# Patient Record
Sex: MALE | Race: BLACK OR AFRICAN AMERICAN | NOT HISPANIC OR LATINO | URBAN - METROPOLITAN AREA
[De-identification: names, ages, dates, MRNs, and addresses within clinical notes are randomized per-mention and may not be internally consistent; named-entity substitution may affect disease eponyms.]

---

## 2019-04-27 ENCOUNTER — INPATIENT (INPATIENT)
Facility: HOSPITAL | Age: 84
LOS: 2 days | Discharge: NURSING, REHAB OR LONG-TERM CARE | End: 2019-04-30
Attending: HOSPITALIST | Admitting: HOSPITALIST
Payer: MEDICARE

## 2019-04-27 VITALS
TEMPERATURE: 98 F | SYSTOLIC BLOOD PRESSURE: 151 MMHG | DIASTOLIC BLOOD PRESSURE: 77 MMHG | HEART RATE: 52 BPM | RESPIRATION RATE: 16 BRPM | OXYGEN SATURATION: 98 %

## 2019-04-27 DIAGNOSIS — I10 ESSENTIAL (PRIMARY) HYPERTENSION: ICD-10-CM

## 2019-04-27 DIAGNOSIS — N39.0 URINARY TRACT INFECTION, SITE NOT SPECIFIED: ICD-10-CM

## 2019-04-27 DIAGNOSIS — I71.4 ABDOMINAL AORTIC ANEURYSM, WITHOUT RUPTURE: ICD-10-CM

## 2019-04-27 DIAGNOSIS — R09.89 OTHER SPECIFIED SYMPTOMS AND SIGNS INVOLVING THE CIRCULATORY AND RESPIRATORY SYSTEMS: ICD-10-CM

## 2019-04-27 DIAGNOSIS — Z29.9 ENCOUNTER FOR PROPHYLACTIC MEASURES, UNSPECIFIED: ICD-10-CM

## 2019-04-27 DIAGNOSIS — R31.9 HEMATURIA, UNSPECIFIED: ICD-10-CM

## 2019-04-27 DIAGNOSIS — R05 COUGH: ICD-10-CM

## 2019-04-27 DIAGNOSIS — D64.9 ANEMIA, UNSPECIFIED: ICD-10-CM

## 2019-04-27 LAB
ALBUMIN SERPL ELPH-MCNC: 3.1 G/DL — LOW (ref 3.3–5)
ALP SERPL-CCNC: 40 U/L — SIGNIFICANT CHANGE UP (ref 40–120)
ALT FLD-CCNC: 7 U/L — SIGNIFICANT CHANGE UP (ref 4–41)
ANION GAP SERPL CALC-SCNC: 9 MMO/L — SIGNIFICANT CHANGE UP (ref 7–14)
APTT BLD: 32.3 SEC — SIGNIFICANT CHANGE UP (ref 27.5–36.3)
AST SERPL-CCNC: 10 U/L — SIGNIFICANT CHANGE UP (ref 4–40)
BASOPHILS # BLD AUTO: 0.06 K/UL — SIGNIFICANT CHANGE UP (ref 0–0.2)
BASOPHILS NFR BLD AUTO: 0.8 % — SIGNIFICANT CHANGE UP (ref 0–2)
BILIRUB SERPL-MCNC: 0.3 MG/DL — SIGNIFICANT CHANGE UP (ref 0.2–1.2)
BLD GP AB SCN SERPL QL: NEGATIVE — SIGNIFICANT CHANGE UP
BUN SERPL-MCNC: 13 MG/DL — SIGNIFICANT CHANGE UP (ref 7–23)
CALCIUM SERPL-MCNC: 8.2 MG/DL — LOW (ref 8.4–10.5)
CHLORIDE SERPL-SCNC: 103 MMOL/L — SIGNIFICANT CHANGE UP (ref 98–107)
CO2 SERPL-SCNC: 22 MMOL/L — SIGNIFICANT CHANGE UP (ref 22–31)
CREAT SERPL-MCNC: 1.16 MG/DL — SIGNIFICANT CHANGE UP (ref 0.5–1.3)
EOSINOPHIL # BLD AUTO: 0.21 K/UL — SIGNIFICANT CHANGE UP (ref 0–0.5)
EOSINOPHIL NFR BLD AUTO: 2.8 % — SIGNIFICANT CHANGE UP (ref 0–6)
FERRITIN SERPL-MCNC: 40.09 NG/ML — SIGNIFICANT CHANGE UP (ref 30–400)
GLUCOSE SERPL-MCNC: 89 MG/DL — SIGNIFICANT CHANGE UP (ref 70–99)
HCT VFR BLD CALC: 27.3 % — LOW (ref 39–50)
HCT VFR BLD CALC: 30.3 % — LOW (ref 39–50)
HGB BLD-MCNC: 8 G/DL — LOW (ref 13–17)
HGB BLD-MCNC: 9.1 G/DL — LOW (ref 13–17)
IMM GRANULOCYTES NFR BLD AUTO: 0.4 % — SIGNIFICANT CHANGE UP (ref 0–1.5)
INR BLD: 1.15 — SIGNIFICANT CHANGE UP (ref 0.88–1.17)
IRON SATN MFR SERPL: 271 UG/DL — SIGNIFICANT CHANGE UP (ref 155–535)
IRON SATN MFR SERPL: 33 UG/DL — LOW (ref 45–165)
LYMPHOCYTES # BLD AUTO: 2.09 K/UL — SIGNIFICANT CHANGE UP (ref 1–3.3)
LYMPHOCYTES # BLD AUTO: 28 % — SIGNIFICANT CHANGE UP (ref 13–44)
MCHC RBC-ENTMCNC: 25.8 PG — LOW (ref 27–34)
MCHC RBC-ENTMCNC: 26 PG — LOW (ref 27–34)
MCHC RBC-ENTMCNC: 29.3 % — LOW (ref 32–36)
MCHC RBC-ENTMCNC: 30 % — LOW (ref 32–36)
MCV RBC AUTO: 85.8 FL — SIGNIFICANT CHANGE UP (ref 80–100)
MCV RBC AUTO: 88.6 FL — SIGNIFICANT CHANGE UP (ref 80–100)
MONOCYTES # BLD AUTO: 0.61 K/UL — SIGNIFICANT CHANGE UP (ref 0–0.9)
MONOCYTES NFR BLD AUTO: 8.2 % — SIGNIFICANT CHANGE UP (ref 2–14)
NEUTROPHILS # BLD AUTO: 4.46 K/UL — SIGNIFICANT CHANGE UP (ref 1.8–7.4)
NEUTROPHILS NFR BLD AUTO: 59.8 % — SIGNIFICANT CHANGE UP (ref 43–77)
NRBC # FLD: 0 K/UL — SIGNIFICANT CHANGE UP (ref 0–0)
NRBC # FLD: 0 K/UL — SIGNIFICANT CHANGE UP (ref 0–0)
PLATELET # BLD AUTO: 218 K/UL — SIGNIFICANT CHANGE UP (ref 150–400)
PLATELET # BLD AUTO: 259 K/UL — SIGNIFICANT CHANGE UP (ref 150–400)
PMV BLD: 9.5 FL — SIGNIFICANT CHANGE UP (ref 7–13)
PMV BLD: 9.8 FL — SIGNIFICANT CHANGE UP (ref 7–13)
POTASSIUM SERPL-MCNC: 3.6 MMOL/L — SIGNIFICANT CHANGE UP (ref 3.5–5.3)
POTASSIUM SERPL-SCNC: 3.6 MMOL/L — SIGNIFICANT CHANGE UP (ref 3.5–5.3)
PROT SERPL-MCNC: 6.3 G/DL — SIGNIFICANT CHANGE UP (ref 6–8.3)
PROTHROM AB SERPL-ACNC: 13.2 SEC — HIGH (ref 9.8–13.1)
RBC # BLD: 3.08 M/UL — LOW (ref 4.2–5.8)
RBC # BLD: 3.53 M/UL — LOW (ref 4.2–5.8)
RBC # FLD: 15.2 % — HIGH (ref 10.3–14.5)
RBC # FLD: 15.3 % — HIGH (ref 10.3–14.5)
RETICS #: 98 K/UL — SIGNIFICANT CHANGE UP (ref 25–125)
RETICS/RBC NFR: 2.8 % — HIGH (ref 0.5–2.5)
RH IG SCN BLD-IMP: POSITIVE — SIGNIFICANT CHANGE UP
SODIUM SERPL-SCNC: 134 MMOL/L — LOW (ref 135–145)
TROPONIN T, HIGH SENSITIVITY: 16 NG/L — SIGNIFICANT CHANGE UP (ref ?–14)
TROPONIN T, HIGH SENSITIVITY: 16 NG/L — SIGNIFICANT CHANGE UP (ref ?–14)
UIBC SERPL-MCNC: 237.5 UG/DL — SIGNIFICANT CHANGE UP (ref 110–370)
WBC # BLD: 7.46 K/UL — SIGNIFICANT CHANGE UP (ref 3.8–10.5)
WBC # BLD: 8.05 K/UL — SIGNIFICANT CHANGE UP (ref 3.8–10.5)
WBC # FLD AUTO: 7.46 K/UL — SIGNIFICANT CHANGE UP (ref 3.8–10.5)
WBC # FLD AUTO: 8.05 K/UL — SIGNIFICANT CHANGE UP (ref 3.8–10.5)

## 2019-04-27 PROCEDURE — 99221 1ST HOSP IP/OBS SF/LOW 40: CPT

## 2019-04-27 PROCEDURE — 99223 1ST HOSP IP/OBS HIGH 75: CPT | Mod: GC

## 2019-04-27 RX ORDER — LISINOPRIL 2.5 MG/1
1 TABLET ORAL
Qty: 0 | Refills: 0 | COMMUNITY

## 2019-04-27 RX ORDER — HYDRALAZINE HCL 50 MG
5 TABLET ORAL EVERY 6 HOURS
Qty: 0 | Refills: 0 | Status: DISCONTINUED | OUTPATIENT
Start: 2019-04-27 | End: 2019-04-29

## 2019-04-27 RX ORDER — CEFTRIAXONE 500 MG/1
1 INJECTION, POWDER, FOR SOLUTION INTRAMUSCULAR; INTRAVENOUS
Qty: 0 | Refills: 0 | Status: DISCONTINUED | OUTPATIENT
Start: 2019-04-27 | End: 2019-04-30

## 2019-04-27 RX ORDER — CEFTRIAXONE 500 MG/1
1 INJECTION, POWDER, FOR SOLUTION INTRAMUSCULAR; INTRAVENOUS ONCE
Qty: 0 | Refills: 0 | Status: COMPLETED | OUTPATIENT
Start: 2019-04-27 | End: 2019-04-27

## 2019-04-27 RX ORDER — METOPROLOL TARTRATE 50 MG
1 TABLET ORAL
Qty: 0 | Refills: 0 | COMMUNITY

## 2019-04-27 RX ORDER — POLYETHYLENE GLYCOL 3350 17 G/17G
0 POWDER, FOR SOLUTION ORAL
Qty: 0 | Refills: 0 | COMMUNITY

## 2019-04-27 RX ORDER — LISINOPRIL 2.5 MG/1
5 TABLET ORAL DAILY
Qty: 0 | Refills: 0 | Status: DISCONTINUED | OUTPATIENT
Start: 2019-04-27 | End: 2019-04-29

## 2019-04-27 RX ORDER — TAMSULOSIN HYDROCHLORIDE 0.4 MG/1
1 CAPSULE ORAL
Qty: 0 | Refills: 0 | COMMUNITY

## 2019-04-27 RX ORDER — TAMSULOSIN HYDROCHLORIDE 0.4 MG/1
0.4 CAPSULE ORAL AT BEDTIME
Qty: 0 | Refills: 0 | Status: DISCONTINUED | OUTPATIENT
Start: 2019-04-27 | End: 2019-04-30

## 2019-04-27 RX ORDER — METOPROLOL TARTRATE 50 MG
37.5 TABLET ORAL
Qty: 0 | Refills: 0 | Status: DISCONTINUED | OUTPATIENT
Start: 2019-04-27 | End: 2019-04-30

## 2019-04-27 RX ORDER — SIMVASTATIN 20 MG/1
10 TABLET, FILM COATED ORAL AT BEDTIME
Qty: 0 | Refills: 0 | Status: DISCONTINUED | OUTPATIENT
Start: 2019-04-27 | End: 2019-04-30

## 2019-04-27 RX ORDER — HYDRALAZINE HCL 50 MG
10 TABLET ORAL EVERY 6 HOURS
Qty: 0 | Refills: 0 | Status: DISCONTINUED | OUTPATIENT
Start: 2019-04-27 | End: 2019-04-29

## 2019-04-27 RX ORDER — HYDRALAZINE HCL 50 MG
5 TABLET ORAL ONCE
Qty: 0 | Refills: 0 | Status: COMPLETED | OUTPATIENT
Start: 2019-04-27 | End: 2019-04-27

## 2019-04-27 RX ORDER — INFLUENZA VIRUS VACCINE 15; 15; 15; 15 UG/.5ML; UG/.5ML; UG/.5ML; UG/.5ML
0.5 SUSPENSION INTRAMUSCULAR ONCE
Qty: 0 | Refills: 0 | Status: DISCONTINUED | OUTPATIENT
Start: 2019-04-27 | End: 2019-04-30

## 2019-04-27 RX ADMIN — SIMVASTATIN 10 MILLIGRAM(S): 20 TABLET, FILM COATED ORAL at 22:56

## 2019-04-27 RX ADMIN — TAMSULOSIN HYDROCHLORIDE 0.4 MILLIGRAM(S): 0.4 CAPSULE ORAL at 22:56

## 2019-04-27 RX ADMIN — CEFTRIAXONE 100 GRAM(S): 500 INJECTION, POWDER, FOR SOLUTION INTRAMUSCULAR; INTRAVENOUS at 03:04

## 2019-04-27 RX ADMIN — CEFTRIAXONE 1 GRAM(S): 500 INJECTION, POWDER, FOR SOLUTION INTRAMUSCULAR; INTRAVENOUS at 04:30

## 2019-04-27 RX ADMIN — Medication 37.5 MILLIGRAM(S): at 22:56

## 2019-04-27 RX ADMIN — LISINOPRIL 5 MILLIGRAM(S): 2.5 TABLET ORAL at 22:56

## 2019-04-27 NOTE — ED PROVIDER NOTE - OBJECTIVE STATEMENT
Attendinyo male presented as transfer from [facility]. pt presented to [facility] for hematuria and cough.  A CTA was performed there which showed a focal area of abdominal aortic aneurysm and dissection which may be chronic in nature.  pt feels fine now and has no pain.

## 2019-04-27 NOTE — ED ADULT TRIAGE NOTE - CHIEF COMPLAINT QUOTE
Transferred from [facility] for aortic aneurysm for eval.  Patient was seen at [facility] for abdominal pain and aortic aneurysm was found on CT scan.  He denies headache, dizziness or generalized pain.  Bilateral lower extremity edema.  History of CVA with right side residual weakness.  /77 in triage.  Appears comfortable and in no apparent distress.

## 2019-04-27 NOTE — ED ADULT NURSE NOTE - OBJECTIVE STATEMENT
received to TB alert and oriented x 4 VSS no c/o abd pain chest pain dizziness or SOB   states he feels good  left 20g heplock intact marquez LE edema noted R>L  SR to SB noted on scope

## 2019-04-27 NOTE — H&P ADULT - ASSESSMENT
83 M with PMH of CVA with residual R sided hemiparesis in 1992, CAD s/p CABG X 3 and stents placed, presented as transfer from [facility] for incidental finding of AAA, also found to have positive UA being treated for UTI.

## 2019-04-27 NOTE — H&P ADULT - PROBLEM SELECTOR PLAN 4
- patient with Hgb of 8.0, unknown baseline, normal MCV  - f/u anemia work up  - maintain active T&S  - CBC q12 - patient with Hgb of 8.0, unknown baseline, normal MCV  - f/u anemia work up  - maintain active T&S  - monitor Hgb

## 2019-04-27 NOTE — CONSULT NOTE ADULT - SUBJECTIVE AND OBJECTIVE BOX
General Surgery Consult  Consulting surgical team: Vascular surgery C team  Consulting attending: Femi    HPI: 83 M Hx of CVA with residual right sided hemiparesis, CAD sp CABG x3 and stents presents as a transfer from [facility] where he presented from his nursing home with chief complaint of hematuria with burning with urination. At [facility] a CTA was done which revealed a AAA with likely thrombosed false lumen from a chronic dissection. He was transfered ED-to-ED for this finding. Upon arrival he was on a nicardipine drip however his outpatient records show a SBP of 152 and remained a 150 while here without drip.       PAST MEDICAL HISTORY: unable to obtain full history besides what is listed above due to patient being poor historian and poor records with patient      PAST SURGICAL HISTORY:  unable to obtain full history besides what is listed above       MEDICATIONS:  unable to obtain full history besides what is listed above       ALLERGIES:  No Known Allergies      VITALS & I/Os:  Vital Signs Last 24 Hrs  T(C): 36.7 (27 Apr 2019 01:28), Max: 36.7 (27 Apr 2019 01:28)  T(F): 98.1 (27 Apr 2019 01:28), Max: 98.1 (27 Apr 2019 01:28)  HR: 56 (27 Apr 2019 01:28) (52 - 56)  BP: 150/72 (27 Apr 2019 01:28) (150/72 - 151/77)  BP(mean): --  RR: 15 (27 Apr 2019 01:28) (15 - 16)  SpO2: 96% (27 Apr 2019 01:28) (96% - 98%)    I&O's Summary      PHYSICAL EXAM:  General: No acute distress, appears nontoxic  Respiratory: Breathing unlabored  Cardiovascular: RRR  Abdominal: Soft, nondistended, nontender. No rebound.  Extremities: Warm, well perfused, no skin defects, ulcers, or erythema  RLE: 1 + femoral pulse, dopp signals from AT and PT, foot warm, sensate, full motor deficit (baseline from CVA) cap refill under 2 seconds  LLE: 1 + Femoral pulse, dopp signals from DP and PT, foot warm, sensate, motor intact, cap refill under 2 seconds      LABS:                        8.0    7.46  )-----------( 218      ( 27 Apr 2019 02:20 )             27.3     04-27    134<L>  |  103  |  13  ----------------------------<  89  3.6   |  22  |  1.16    Ca    8.2<L>      27 Apr 2019 02:20    TPro  6.3  /  Alb  3.1<L>  /  TBili  0.3  /  DBili  x   /  AST  10  /  ALT  7   /  AlkPhos  40  04-27    Lactate:                  IMAGING: Report from [facility] for CTA indicates the presence of AAA (3.9 x 3.8) with possibly chronic thrombosed false lumen and a small saccular outpouching.

## 2019-04-27 NOTE — CONSULT NOTE ADULT - ATTENDING COMMENTS
small asymptomatic, incidentally found AAA  no indication for repair based on size  repeat imaging in 1y to monitor growth
On call attending. Pt seen and examined. Agree with above and edited as appropriate.

## 2019-04-27 NOTE — CONSULT NOTE ADULT - ASSESSMENT
82 yo prior smoker with PMH significant for CVA, CAD s/p CABG with 2 month history of gross hematuria. Pt with CT however it was not CT urogram protocol    -Follow up urine culture  -CT urogram  -Remainder of workup can be done as outpatient:   - Urine cytology  - Cystoscopy  -Pt to f/u with Dr. Reyes at [facility] [phone] 84 yo prior smoker with PMH significant for CVA, CAD s/p CABG with 2 month history of gross hematuria. Pt with CT however it was not CT urogram protocol    -Follow up urine culture  -CT urogram  -Remainder of workup can be done as outpatient:   -Urine cytology  -Cystoscopy  -Pt to f/u with Dr. Reyes at [facility] [phone]

## 2019-04-27 NOTE — H&P ADULT - PROBLEM SELECTOR PLAN 1
- CT A/P done with incidental finding of a 3.9 by 3.8 cm infrarenal abdominal aortic aneurysm. An age-indeterminate (possibly chronic with thrombosed false lumen) focal, short segment dissection is favored over thrombosed aneurysm.  - Vascular surgery consulted - An asymptomatic AAA of this size does not require intervention. Recommended ASA, plavix, however will hold in the setting of anemia and hematuria  - keep systolic blood pressure below 160

## 2019-04-27 NOTE — H&P ADULT - NSHPSOCIALHISTORY_GEN_ALL_CORE
Patient smoked for 1 pack for 20 years, quit many years ago, unsure when. Has not had alcohol in many years. No recreational drugs. Patient lives in nursing home. Patient has a son.

## 2019-04-27 NOTE — CONSULT NOTE ADULT - SUBJECTIVE AND OBJECTIVE BOX
HPI 82 yo male admitted to [facility] for gross hematuria and cough. Pt with h/o CVA and had some difficulty describing his history although pt was not altered. At [facility] pt presumed to have UTI based on u/a in setting of gross hematuria. Pt underwent CT chest/abd with incidentally found 3.9 cm AAA and was transferred to [facility] for management of AAA which vascular described as asymptomatic and pt will f/u outpatient.     Per pt, he has had gross hematuria for past 2 months associated with dysuria. Denies frequency, urgency, difficulty emptying, abd/flank/back pain. Pt states he is a past smoker but is unable to quantify pack years. Denies any known chemical exposures.     PAST MEDICAL & SURGICAL HISTORY:  CVA (cerebral vascular accident)      MEDICATIONS  (STANDING):  cefTRIAXone   IVPB 1 Gram(s) IV Intermittent <User Schedule>  influenza   Vaccine 0.5 milliLiter(s) IntraMuscular once    MEDICATIONS  (PRN):  guaiFENesin    Syrup 100 milliGRAM(s) Oral every 6 hours PRN Cough      FAMILY HISTORY:      Allergies    No Known Allergies    Intolerances        SOCIAL HISTORY:    REVIEW OF SYSTEMS: Otherwise negative as stated in HPI    Physical Exam  Vital signs  T(C): 36.4 (04-27-19 @ 15:25), Max: 36.7 (04-27-19 @ 01:28)  HR: 70 (04-27-19 @ 15:25)  BP: 162/89 (04-27-19 @ 15:25)  SpO2: 100% (04-27-19 @ 15:25)  Wt(kg): --    Output    UOP    Gen:  [x] NAD [] toxic    Pulm:  [x] no resp distress [x] no substernal retractions  	  CV:  [x] no JVD  [x] RRR    GI:  [x] Soft [x] ND [x] NT    :  Glans Circumcised []Y  []N, []lesions:  Meatus Discharge []Y  [x] N,  Blood [x]Y [] N  Testes  Descended [x]Y  []N,    Tender []Y  [x]N,   Epididymis Tender  []Y [x]N                        	  MSK:  Edema []Y []N    LABS:      04-27 @ 14:43    WBC 8.05  / Hct 30.3  / SCr --       04-27 @ 02:20    WBC 7.46  / Hct 27.3  / SCr 1.16     04-27    134<L>  |  103  |  13  ----------------------------<  89  3.6   |  22  |  1.16    Ca    8.2<L>      27 Apr 2019 02:20    TPro  6.3  /  Alb  3.1<L>  /  TBili  0.3  /  DBili  x   /  AST  10  /  ALT  7   /  AlkPhos  40  04-27    PT/INR - ( 27 Apr 2019 08:00 )   PT: 13.2 SEC;   INR: 1.15          PTT - ( 27 Apr 2019 08:00 )  PTT:32.3 SEC      Urine Cx: pending    RADIOLOGY:    CT C/A/P showing bladder wall thickening (Not CT urogram protocol)

## 2019-04-27 NOTE — H&P ADULT - PROBLEM SELECTOR PLAN 3
- patient with gross hematuria, possibly in the setting of infection  - CT showed no renal stones - patient with gross hematuria, possibly in the setting of infection  - CT showed no renal stones  - may need further workup for malignancy - patient with gross hematuria, possibly in the setting of infection  - CT showed no renal stones  - consult urology, will f/u recs - patient with gross hematuria, possibly in the setting of infection vs malignancy  - CT showed no renal stones  - consult urology, will f/u recs

## 2019-04-27 NOTE — ED PROVIDER NOTE - PROGRESS NOTE DETAILS
Vascular surgery consulted and have no interventions planned at this time, outpatient follow up is acceptable. Pt has positive UA at OSH, given cipro. Added ceftriaxone. Hgb decreased 9 to 8, will admit for abx and monitoring hgb.

## 2019-04-27 NOTE — H&P ADULT - PROBLEM SELECTOR PLAN 6
- will need to verify BP meds with nursing home  - goal SBP <160 in the setting of AAA - will need to verify BP meds with nursing home  - goal SBP <160 in the setting of AAA  - resume home meds, lisinopril, metoprolol - will need to verify BP meds with nursing home  - goal SBP <160 in the setting of AAA  - resume home meds, lisinopril, metoprolol  - IV hydralazine if SBP>160

## 2019-04-27 NOTE — ED ADULT NURSE NOTE - CHIEF COMPLAINT QUOTE
Transferred from [facility] for aortic aneurysm for eval.  Patient was seen at [facility] for abdominal pain and aortic aneurysm was found on CT scan.  He denies headache, dizziness or generalized pain.  Bilateral lower extremity edema.  History of CVA with right side residual weakness.  /77 in triage.  Appears comfortable and in no apparent distress.

## 2019-04-27 NOTE — H&P ADULT - NSHPLABSRESULTS_GEN_ALL_CORE
8.0    7.46  )-----------( 218      ( 27 Apr 2019 02:20 )             27.3       04-27    134<L>  |  103  |  13  ----------------------------<  89  3.6   |  22  |  1.16    Ca    8.2<L>      27 Apr 2019 02:20    TPro  6.3  /  Alb  3.1<L>  /  TBili  0.3  /  DBili  x   /  AST  10  /  ALT  7   /  AlkPhos  40  04-27            LIVER FUNCTIONS - ( 27 Apr 2019 02:20 )  Alb: 3.1 g/dL / Pro: 6.3 g/dL / ALK PHOS: 40 u/L / ALT: 7 u/L / AST: 10 u/L / GGT: x             PT/INR - ( 27 Apr 2019 08:00 )   PT: 13.2 SEC;   INR: 1.15          PTT - ( 27 Apr 2019 08:00 )  PTT:32.3 SEC    Imaging from OSH in patient's chart

## 2019-04-27 NOTE — CONSULT NOTE ADULT - ASSESSMENT
83 M p/w UTI and hematuria with incidental finding of AAA with possible thrombosed false lumen from chronic dissection  - An asymptomatic AAA of this size does not require intervention  - recommend ASA, plavix, keep systolic blood pressure below 160  - can follow-up with Dr. 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 Kahn as outpatient for monitoring of AAA growth    IRWIN Foster  PGY 3 Vascular Surgery Team C

## 2019-04-27 NOTE — ED PROVIDER NOTE - CLINICAL SUMMARY MEDICAL DECISION MAKING FREE TEXT BOX
Area of aneurysm and possible dissection on CTA from outside hospital but unclear of chronicity.  will consult vascular surgery for management recommendations..  2.  hematuria.  possible infection.  will treat with antibiotics.  check H/H.

## 2019-04-27 NOTE — H&P ADULT - NSHPPHYSICALEXAM_GEN_ALL_CORE
PHYSICAL EXAM:    Vital Signs Last 24 Hrs  T(C): 36.5 (27 Apr 2019 09:09), Max: 36.7 (27 Apr 2019 01:28)  T(F): 97.7 (27 Apr 2019 09:09), Max: 98.1 (27 Apr 2019 01:28)  HR: 66 (27 Apr 2019 11:51) (52 - 66)  BP: 113/98 (27 Apr 2019 11:51) (113/98 - 171/91)  BP(mean): --  RR: 20 (27 Apr 2019 09:09) (15 - 25)  SpO2: 100% (27 Apr 2019 09:09) (93% - 100%)    General: No acute distress.  HEENT: NCAT.  PERRL.  EOMI.  No scleral icterus or injection.  Moist MM.  No oropharyngeal exudates.    Neck: Supple.  Full ROM. + submandibular lymphadenopathy  Heart: RRR.  Normal S1 and S2.   Lungs: CTAB, poor inspiratory effort  Abdomen: BS+, soft, NT/ND.  No organomegaly. No suprapubic tenderness. No CVA tenderness  Skin: Warm and dry.  No rashes.  Extremities: RLE 2+ edema up to mid tibia, no LLE edema.   Musculoskeletal: RUE and RLE paralysis. LUE and LLE movement and sensation in tact  Neuro: A&Ox2.

## 2019-04-27 NOTE — H&P ADULT - HISTORY OF PRESENT ILLNESS
83 M with PMH of CVA with residual R sided hemiparesis, CAD s/p CABG X 3 and stents placed, presented as transfer from [facility] who originally presented to [place] with hematuria. A CTA was performed there which showed a focal area of abdominal aortic aneurysm and dissection which may be chronic in nature. Patient denied any pain.     83 M Hx of CVA with residual right sided hemiparesis, CAD sp CABG x3 and stents [facility] presented from his [facility] home with chief complaint of hematuria with burning with urination. At [facility] a CTA was done which revealed a AAA with likely thrombosed false lumen from a chronic dissection. He was transfered ED-to-ED for this finding. Upon arrival he was on a nicardipine drip however his outpatient records show a SBP of 152 and remained a 150 while here without drip 83 M with PMH of CVA with residual R sided hemiparesis, CAD s/p CABG X 3 and stents placed, presented as transfer from [facility] who originally presented to [place] with hematuria. A CTA was performed which revealed a AAA with likely thrombosed false lumen from a chronic dissection. He was transfered to [facility] ED for this finding. Upon arrival he was on a nicardipine drip however outpatient records show a SBP of 152. Patient continues to complain of hematuria.     In the ED T 97.7, HR 60, /91, RR 20 satting 100%. Urine culture was sent and patient received ceftriaxone X1. 83 M with PMH of CVA with residual R sided hemiparesis in 1992, CAD s/p CABG X 3 and stents placed, presented as transfer from [facility] who originally presented to [place] with hematuria and cough. Hematuria has been present for past 2 months with some burning on urination. Cough is productive with some phlegm for past few days. Patient had a CXR done for cough which showed mild peribronchial wall thickening at the lung bases, questionable viral infection/bronchitis, chronic bronchitis or asthma and stable cardiomegaly. Patient denies any fever, chills, injury, nausea, vomiting or abdominal pain. At [facility] patient found to have positive UA with nitrite and leukocyte esterase and CT A/P with contrast that showed wall thickening of urinary bladder with inflammatory changes suggestive of cystitis, and was given one dose of IV cipro. Patient had a CTA and CT A/P done with incidental finding of a 3.9 by 3.8 cm infrarenal abdominal aortic aneurysm. An age-indeterminate (possibly chronic with thrombosed false lumen) focal, short segment dissection is favored over thrombosed aneurysm. Patient was found to be hypertensive, given labetalol 20 IVP, clonodine, and started on a nicardipine drip. Patient was then transferred to [facility] ED for AAA findings.     In the ED T 97.7, HR 60, /91, RR 20 satting 100%. Urine culture was sent and patient received ceftriaxone X1.

## 2019-04-27 NOTE — H&P ADULT - PROBLEM SELECTOR PLAN 2
- UA from OSH positive with large nitrite and leukocyte esterase, >500 WBC and RBCs  - CT A/P with wall thickening of urinary bladder suggestive of cystitis  - s/p IV cipro at OSH, ceftriaxone X1 in the ED  - pending urine cultures - UA from OSH positive with large nitrite and leukocyte esterase, >500 WBC and RBCs  - CT A/P with wall thickening of urinary bladder suggestive of cystitis  - s/p IV cipro at OSH, ceftriaxone X1 in the ED, continue ceftriaxone for now  - pending urine cultures

## 2019-04-27 NOTE — H&P ADULT - PROBLEM SELECTOR PLAN 5
- productive cough with CXR showing peribroncial wall thickening suggestive of possible viral infection vs bronchitis  - afebrile, continue to monitor VS  - - productive cough with CXR showing peribroncial wall thickening suggestive of possible viral infection vs bronchitis  - afebrile, continue to monitor VS - productive cough with CXR showing peribroncial wall thickening suggestive of possible viral infection vs bronchitis  - afebrile, continue off abx  - aspiration precautions

## 2019-04-27 NOTE — H&P ADULT - NSHPREVIEWOFSYSTEMS_GEN_ALL_CORE
REVIEW OF SYSTEMS:    CONSTITUTIONAL: No weakness, fevers or chills  EYES/ENT: No visual changes;  No vertigo or throat pain   NECK: No pain or stiffness  RESPIRATORY: + cough, no wheezing, hemoptysis; Occasional shortness of breath, none at this time  CARDIOVASCULAR: No chest pain or palpitations  GASTROINTESTINAL: No abdominal pain; nausea, vomiting;  diarrhea or constipation. No hematemesis, melena or hematochezia.  GENITOURINARY: + dysuria, hematuria  NEUROLOGICAL: Bedbound due to R sided hemiparesis  SKIN: No itching, burning, rashes, or lesions   All other review of systems is negative unless indicated above.

## 2019-04-27 NOTE — H&P ADULT - PROBLEM SELECTOR PLAN 7
- DVT ppx: SCD  - Diet: regular diet  - Dispo: pending - enlarged nodular prostate w/ mass effect on bladder  - consider PSA for prostate cancer workup - enlarged nodular prostate w/ mass effect on bladder  - continue tamsulosin 0.4mg daily  - consider PSA for prostate cancer workup

## 2019-04-27 NOTE — H&P ADULT - ATTENDING COMMENTS
Agree with above. Patient with 2 months hematuria, presented from OSH after evaluation for cough and hematuria led to cTA showing aneurysm.  [facility], no surgical intervention by vascular surgery, admitted to medicine. Patinent with chronic cough and chronic hematuria, no new complaints. STates hematuria has been worsening, therefore wanted to go to hospital    #Gross hematuria  -patient with painless hematuria, concerning for malignancy.  -urology appreciated, no need for CBI at this time.  -CT urogram,will likely hold off 24-48 hours given recent contrast load for CTA  -monitor Hb    #Chronic aortic aneurysm  -apprciate vascular recs, holding ASA/Plavix though currently with hematuria  -if cleared by urology will restart at least ASA  -continue metoprolol and lisinopril, maintain SBP<160.    #cough  -chronic cough, no other signs of URI  -CXR without clear evidence of infection  -will not check RVP as do not think cough is infectious at this time, would not change current management, which will be supportive.  -outpaitent workup for chronic cough, but may trial PPI here    Rest as above

## 2019-04-28 LAB
BACTERIA UR CULT: SIGNIFICANT CHANGE UP
BLD GP AB SCN SERPL QL: NEGATIVE — SIGNIFICANT CHANGE UP
RH IG SCN BLD-IMP: POSITIVE — SIGNIFICANT CHANGE UP
SPECIMEN SOURCE: SIGNIFICANT CHANGE UP

## 2019-04-28 PROCEDURE — 99222 1ST HOSP IP/OBS MODERATE 55: CPT

## 2019-04-28 PROCEDURE — 99233 SBSQ HOSP IP/OBS HIGH 50: CPT | Mod: GC

## 2019-04-28 RX ORDER — POLYETHYLENE GLYCOL 3350 17 G/17G
17 POWDER, FOR SOLUTION ORAL DAILY
Qty: 0 | Refills: 0 | Status: DISCONTINUED | OUTPATIENT
Start: 2019-04-28 | End: 2019-04-30

## 2019-04-28 RX ADMIN — Medication 37.5 MILLIGRAM(S): at 06:14

## 2019-04-28 RX ADMIN — SIMVASTATIN 10 MILLIGRAM(S): 20 TABLET, FILM COATED ORAL at 22:37

## 2019-04-28 RX ADMIN — LISINOPRIL 5 MILLIGRAM(S): 2.5 TABLET ORAL at 06:14

## 2019-04-28 RX ADMIN — CEFTRIAXONE 100 GRAM(S): 500 INJECTION, POWDER, FOR SOLUTION INTRAMUSCULAR; INTRAVENOUS at 08:24

## 2019-04-28 RX ADMIN — TAMSULOSIN HYDROCHLORIDE 0.4 MILLIGRAM(S): 0.4 CAPSULE ORAL at 22:37

## 2019-04-28 RX ADMIN — POLYETHYLENE GLYCOL 3350 17 GRAM(S): 17 POWDER, FOR SOLUTION ORAL at 17:19

## 2019-04-28 RX ADMIN — Medication 37.5 MILLIGRAM(S): at 17:18

## 2019-04-28 NOTE — PROGRESS NOTE ADULT - PROBLEM SELECTOR PLAN 2
- UA from OSH positive with large nitrite and leukocyte esterase, >500 WBC and RBCs  - CT A/P with wall thickening of urinary bladder suggestive of cystitis  - s/p IV cipro at OSH, ceftriaxone X1 in the ED, continue ceftriaxone for now  - pending urine cultures - UA from OSH positive with large nitrite and leukocyte esterase, >500 WBC and RBCs  - CT A/P with wall thickening of urinary bladder suggestive of cystitis  - s/p IV cipro at OSH, ceftriaxone X1 in the ED, continue ceftriaxone for now  - urine cultures negative

## 2019-04-28 NOTE — PROGRESS NOTE ADULT - PROBLEM SELECTOR PLAN 1
- CT A/P done with incidental finding of a 3.9 by 3.8 cm infrarenal abdominal aortic aneurysm. An age-indeterminate (possibly chronic with thrombosed false lumen) focal, short segment dissection is favored over thrombosed aneurysm.  - Vascular surgery consulted - An asymptomatic AAA of this size does not require intervention. Recommended ASA, plavix, however will hold in the setting of anemia and hematuria  - keep systolic blood pressure below 160 - patient with gross hematuria, possibly in the setting of infection vs malignancy  - CT showed no renal stones  - urology recs greatly appreciated. Patient pending CT urogram  - will need further workup outpatient with cystoscopy

## 2019-04-28 NOTE — PROGRESS NOTE ADULT - ATTENDING COMMENTS
Patient was seen and examined personally by me. I have discussed the plan and reviewed the resident's note and agree with the above physical exam findings including assessment and plan except as indicated below.    # Painless gross hematuria  # UTI  # RLE swelling  # AAA  # h/o CVA with right hemiparesis  # hx CABG with stents    H/H presently stable  Obtain Ucx results from [facility]. 4/27 Ucx here so far NGTD. C/w CTX-day 2  Monitor urine color, output, urology following  CT urogram pending, outpatient cystoscopy  Continue metoprolol and lisinopril, maintain SBP<160. No surgical intervention for AAA per vascular  Check RLE US

## 2019-04-28 NOTE — PROGRESS NOTE ADULT - PROBLEM SELECTOR PLAN 3
- patient with gross hematuria, possibly in the setting of infection vs malignancy  - CT showed no renal stones  - urology recs greatly appreciated. Patient pending CT urogram  - will need further workup outpatient with cystoscopy - CT A/P done with incidental finding of a 3.9 by 3.8 cm infrarenal abdominal aortic aneurysm. An age-indeterminate (possibly chronic with thrombosed false lumen) focal, short segment dissection is favored over thrombosed aneurysm.  - Vascular surgery consulted - An asymptomatic AAA of this size does not require intervention. Recommended ASA, plavix, however will hold in the setting of anemia and hematuria  - keep systolic blood pressure below 160

## 2019-04-28 NOTE — PHYSICAL THERAPY INITIAL EVALUATION ADULT - RANGE OF MOTION EXAMINATION, REHAB EVAL
Left UE ROM was WFL (within functional limits)/Left LE ROM was WFL (within functional limits)/R sh/: 0-45, elbow and hand WFL/deficits as listed below

## 2019-04-28 NOTE — PROGRESS NOTE ADULT - SUBJECTIVE AND OBJECTIVE BOX
Angélica Samuels, PGY1  Pager: [phone]  After 7: Night Float pager  Alternate contact:     Patient is a 83y old  Male who presents with a chief complaint of Transfer for asymptomatic AAA (3.9cm) (27 Apr 2019 19:47)      SUBJECTIVE / OVERNIGHT EVENTS: No acute events overnight.     MEDICATIONS  (STANDING):  cefTRIAXone   IVPB 1 Gram(s) IV Intermittent <User Schedule>  influenza   Vaccine 0.5 milliLiter(s) IntraMuscular once  lisinopril 5 milliGRAM(s) Oral daily  metoprolol tartrate 37.5 milliGRAM(s) Oral two times a day  polyethylene glycol 3350 17 Gram(s) Oral daily  simvastatin 10 milliGRAM(s) Oral at bedtime  tamsulosin 0.4 milliGRAM(s) Oral at bedtime    MEDICATIONS  (PRN):  guaiFENesin    Syrup 100 milliGRAM(s) Oral every 6 hours PRN Cough  hydrALAZINE Injectable 10 milliGRAM(s) IV Push every 6 hours PRN For SBP >180  hydrALAZINE Injectable 5 milliGRAM(s) IV Push every 6 hours PRN For SBP >160 and <180      Vital Signs Last 24 Hrs  T(C): 36.7 (28 Apr 2019 05:46), Max: 36.8 (28 Apr 2019 02:21)  T(F): 98.1 (28 Apr 2019 05:46), Max: 98.2 (28 Apr 2019 02:21)  HR: 66 (28 Apr 2019 05:46) (60 - 89)  BP: 140/56 (28 Apr 2019 05:46) (113/98 - 171/91)  BP(mean): --  RR: 17 (28 Apr 2019 05:46) (17 - 20)  SpO2: 98% (28 Apr 2019 05:46) (98% - 100%)  CAPILLARY BLOOD GLUCOSE        I&O's Summary    27 Apr 2019 07:01  -  28 Apr 2019 07:00  --------------------------------------------------------  IN: 0 mL / OUT: 1100 mL / NET: -1100 mL        PHYSICAL EXAM:  GENERAL: NAD, well-developed  EYES: conjunctiva and sclera clear  NECK:  No JVD  CHEST/LUNG: CTABL; No wheeze  HEART: RRR; No murmurs  ABDOMEN: Soft, Nontender, Nondistended; Bowel sounds present  : external cath  EXTREMITIES:  RLE 1+ edema mid tibia, LLE no edema  PSYCH: AAOx2  NEUROLOGY: non-focal  SKIN: No rashes or lesions. No sacral ulcer    LABS:                        9.1    8.05  )-----------( 259      ( 27 Apr 2019 14:43 )             30.3     04-27    134<L>  |  103  |  13  ----------------------------<  89  3.6   |  22  |  1.16    Ca    8.2<L>      27 Apr 2019 02:20    TPro  6.3  /  Alb  3.1<L>  /  TBili  0.3  /  DBili  x   /  AST  10  /  ALT  7   /  AlkPhos  40  04-27    PT/INR - ( 27 Apr 2019 08:00 )   PT: 13.2 SEC;   INR: 1.15          PTT - ( 27 Apr 2019 08:00 )  PTT:32.3 SEC          Microbiology;        RADIOLOGY & ADDITIONAL TESTS:    Imaging Personally Reviewed:          Consultant(s) Notes Reviewed:      Care Discussed with Consultants/Other Providers: Angélica Samuels, PGY1  Pager: [phone]  After 7: Night Float pager  Alternate contact:     Patient is a 83y old  Male who presents with a chief complaint of Transfer for asymptomatic AAA (3.9cm) (27 Apr 2019 19:47)      SUBJECTIVE / OVERNIGHT EVENTS: No acute events overnight. Patient's cough is improving, still with hematuria, no dysuria. Patient tolerating diet, no other complaints    MEDICATIONS  (STANDING):  cefTRIAXone   IVPB 1 Gram(s) IV Intermittent <User Schedule>  influenza   Vaccine 0.5 milliLiter(s) IntraMuscular once  lisinopril 5 milliGRAM(s) Oral daily  metoprolol tartrate 37.5 milliGRAM(s) Oral two times a day  polyethylene glycol 3350 17 Gram(s) Oral daily  simvastatin 10 milliGRAM(s) Oral at bedtime  tamsulosin 0.4 milliGRAM(s) Oral at bedtime    MEDICATIONS  (PRN):  guaiFENesin    Syrup 100 milliGRAM(s) Oral every 6 hours PRN Cough  hydrALAZINE Injectable 10 milliGRAM(s) IV Push every 6 hours PRN For SBP >180  hydrALAZINE Injectable 5 milliGRAM(s) IV Push every 6 hours PRN For SBP >160 and <180      Vital Signs Last 24 Hrs  T(C): 36.7 (28 Apr 2019 05:46), Max: 36.8 (28 Apr 2019 02:21)  T(F): 98.1 (28 Apr 2019 05:46), Max: 98.2 (28 Apr 2019 02:21)  HR: 66 (28 Apr 2019 05:46) (60 - 89)  BP: 140/56 (28 Apr 2019 05:46) (113/98 - 171/91)  BP(mean): --  RR: 17 (28 Apr 2019 05:46) (17 - 20)  SpO2: 98% (28 Apr 2019 05:46) (98% - 100%)  CAPILLARY BLOOD GLUCOSE        I&O's Summary    27 Apr 2019 07:01  -  28 Apr 2019 07:00  --------------------------------------------------------  IN: 0 mL / OUT: 1100 mL / NET: -1100 mL        PHYSICAL EXAM:  GENERAL: NAD, well-developed  EYES: conjunctiva and sclera clear  NECK:  No JVD  CHEST/LUNG: CTABL; No wheeze  HEART: RRR; No murmurs  ABDOMEN: Soft, Nontender, Nondistended; Bowel sounds present  : external cath  EXTREMITIES:  RLE 1+ edema mid tibia, LLE no edema  PSYCH: AAOx3  NEUROLOGY: non-focal  SKIN: No rashes or lesions. No sacral ulcer    LABS:                        9.1    8.05  )-----------( 259      ( 27 Apr 2019 14:43 )             30.3     04-27    134<L>  |  103  |  13  ----------------------------<  89  3.6   |  22  |  1.16    Ca    8.2<L>      27 Apr 2019 02:20    TPro  6.3  /  Alb  3.1<L>  /  TBili  0.3  /  DBili  x   /  AST  10  /  ALT  7   /  AlkPhos  40  04-27    PT/INR - ( 27 Apr 2019 08:00 )   PT: 13.2 SEC;   INR: 1.15          PTT - ( 27 Apr 2019 08:00 )  PTT:32.3 SEC

## 2019-04-29 DIAGNOSIS — R60.0 LOCALIZED EDEMA: ICD-10-CM

## 2019-04-29 LAB
ANION GAP SERPL CALC-SCNC: 11 MMO/L — SIGNIFICANT CHANGE UP (ref 7–14)
BUN SERPL-MCNC: 10 MG/DL — SIGNIFICANT CHANGE UP (ref 7–23)
CALCIUM SERPL-MCNC: 8.8 MG/DL — SIGNIFICANT CHANGE UP (ref 8.4–10.5)
CHLORIDE SERPL-SCNC: 107 MMOL/L — SIGNIFICANT CHANGE UP (ref 98–107)
CO2 SERPL-SCNC: 23 MMOL/L — SIGNIFICANT CHANGE UP (ref 22–31)
CREAT SERPL-MCNC: 1.07 MG/DL — SIGNIFICANT CHANGE UP (ref 0.5–1.3)
GLUCOSE SERPL-MCNC: 87 MG/DL — SIGNIFICANT CHANGE UP (ref 70–99)
HCT VFR BLD CALC: 30.8 % — LOW (ref 39–50)
HGB BLD-MCNC: 9.2 G/DL — LOW (ref 13–17)
MAGNESIUM SERPL-MCNC: 2 MG/DL — SIGNIFICANT CHANGE UP (ref 1.6–2.6)
MCHC RBC-ENTMCNC: 26.1 PG — LOW (ref 27–34)
MCHC RBC-ENTMCNC: 29.9 % — LOW (ref 32–36)
MCV RBC AUTO: 87.5 FL — SIGNIFICANT CHANGE UP (ref 80–100)
NRBC # FLD: 0 K/UL — SIGNIFICANT CHANGE UP (ref 0–0)
PHOSPHATE SERPL-MCNC: 2.8 MG/DL — SIGNIFICANT CHANGE UP (ref 2.5–4.5)
PLATELET # BLD AUTO: 223 K/UL — SIGNIFICANT CHANGE UP (ref 150–400)
PMV BLD: 9.6 FL — SIGNIFICANT CHANGE UP (ref 7–13)
POTASSIUM SERPL-MCNC: 4.1 MMOL/L — SIGNIFICANT CHANGE UP (ref 3.5–5.3)
POTASSIUM SERPL-SCNC: 4.1 MMOL/L — SIGNIFICANT CHANGE UP (ref 3.5–5.3)
RBC # BLD: 3.52 M/UL — LOW (ref 4.2–5.8)
RBC # FLD: 15 % — HIGH (ref 10.3–14.5)
SODIUM SERPL-SCNC: 141 MMOL/L — SIGNIFICANT CHANGE UP (ref 135–145)
WBC # BLD: 7.7 K/UL — SIGNIFICANT CHANGE UP (ref 3.8–10.5)
WBC # FLD AUTO: 7.7 K/UL — SIGNIFICANT CHANGE UP (ref 3.8–10.5)

## 2019-04-29 PROCEDURE — 74178 CT ABD&PLV WO CNTR FLWD CNTR: CPT | Mod: 26

## 2019-04-29 PROCEDURE — 99232 SBSQ HOSP IP/OBS MODERATE 35: CPT | Mod: GC

## 2019-04-29 RX ORDER — LISINOPRIL 2.5 MG/1
10 TABLET ORAL DAILY
Qty: 0 | Refills: 0 | Status: DISCONTINUED | OUTPATIENT
Start: 2019-04-30 | End: 2019-04-30

## 2019-04-29 RX ORDER — HYDRALAZINE HCL 50 MG
10 TABLET ORAL EVERY 6 HOURS
Qty: 0 | Refills: 0 | Status: DISCONTINUED | OUTPATIENT
Start: 2019-04-29 | End: 2019-04-30

## 2019-04-29 RX ADMIN — LISINOPRIL 5 MILLIGRAM(S): 2.5 TABLET ORAL at 05:42

## 2019-04-29 RX ADMIN — Medication 37.5 MILLIGRAM(S): at 17:04

## 2019-04-29 RX ADMIN — SIMVASTATIN 10 MILLIGRAM(S): 20 TABLET, FILM COATED ORAL at 21:53

## 2019-04-29 RX ADMIN — TAMSULOSIN HYDROCHLORIDE 0.4 MILLIGRAM(S): 0.4 CAPSULE ORAL at 21:53

## 2019-04-29 RX ADMIN — POLYETHYLENE GLYCOL 3350 17 GRAM(S): 17 POWDER, FOR SOLUTION ORAL at 11:07

## 2019-04-29 RX ADMIN — CEFTRIAXONE 100 GRAM(S): 500 INJECTION, POWDER, FOR SOLUTION INTRAMUSCULAR; INTRAVENOUS at 08:42

## 2019-04-29 NOTE — PROGRESS NOTE ADULT - PROBLEM SELECTOR PLAN 4
- patient with Hgb of 8.0, unknown baseline, normal MCV  - anemia with low Fe, ferritin low end. Consider starting Fe pills  - monitor Hgb in the setting of hematuria

## 2019-04-29 NOTE — PROGRESS NOTE ADULT - PROBLEM SELECTOR PLAN 1
- patient with gross hematuria, possibly in the setting of infection vs malignancy  - CT showed no renal stones  - urology recs greatly appreciated. Patient pending CT urogram  - will need further workup outpatient with cystoscopy  - hemoglobin stable

## 2019-04-29 NOTE — PROGRESS NOTE ADULT - PROBLEM SELECTOR PLAN 2
- UA from OSH positive with large nitrite and leukocyte esterase, >500 WBC and RBCs  - CT A/P with wall thickening of urinary bladder suggestive of cystitis  - s/p IV cipro at OSH, ceftriaxone X1 in the ED, continue ceftriaxone for now  - urine cultures negative

## 2019-04-29 NOTE — PROGRESS NOTE ADULT - ATTENDING COMMENTS
Patient was seen and examined personally by me. I have discussed the plan and reviewed the resident's note and agree with the above physical exam findings including assessment and plan except as indicated below.    # Painless gross hematuria  # UTI  # RLE swelling  # AAA  # h/o CVA with right hemiparesis  # hx CABG with stents    H/H presently stable. Notable dark red urine output in condom cath  Obtain Ucx results from [facility]. 4/27 Ucx here so far NGTD. C/w CTX-day 3.   CT urogram with ?cystitis and bladder hematoma. Outpatient cystoscopy  Continue metoprolol and lisinopril, maintain SBP<160. No surgical intervention for AAA per vascular  Check RLE US

## 2019-04-29 NOTE — PROGRESS NOTE ADULT - SUBJECTIVE AND OBJECTIVE BOX
Angélica Samuels, PGY1  Pager: [phone]  After 7: Night Float pager  Alternate contact:     Patient is a 83y old  Male who presents with a chief complaint of aortic aneurysm (28 Apr 2019 07:57)      SUBJECTIVE / OVERNIGHT EVENTS: No acute events overnight.     MEDICATIONS  (STANDING):  cefTRIAXone   IVPB 1 Gram(s) IV Intermittent <User Schedule>  influenza   Vaccine 0.5 milliLiter(s) IntraMuscular once  lisinopril 5 milliGRAM(s) Oral daily  metoprolol tartrate 37.5 milliGRAM(s) Oral two times a day  polyethylene glycol 3350 17 Gram(s) Oral daily  simvastatin 10 milliGRAM(s) Oral at bedtime  tamsulosin 0.4 milliGRAM(s) Oral at bedtime    MEDICATIONS  (PRN):  guaiFENesin    Syrup 100 milliGRAM(s) Oral every 6 hours PRN Cough  hydrALAZINE Injectable 10 milliGRAM(s) IV Push every 6 hours PRN For SBP >180  hydrALAZINE Injectable 5 milliGRAM(s) IV Push every 6 hours PRN For SBP >160 and <180      Vital Signs Last 24 Hrs  T(C): 36.8 (29 Apr 2019 05:27), Max: 37.2 (28 Apr 2019 14:13)  T(F): 98.2 (29 Apr 2019 05:27), Max: 98.9 (28 Apr 2019 14:13)  HR: 60 (29 Apr 2019 05:27) (57 - 61)  BP: 160/84 (29 Apr 2019 05:27) (139/50 - 178/72)  BP(mean): --  RR: 16 (29 Apr 2019 05:27) (16 - 20)  SpO2: 100% (29 Apr 2019 05:27) (96% - 100%)  CAPILLARY BLOOD GLUCOSE        I&O's Summary    28 Apr 2019 07:01  -  29 Apr 2019 07:00  --------------------------------------------------------  IN: 0 mL / OUT: 200 mL / NET: -200 mL        PHYSICAL EXAM:  GENERAL: NAD, well-developed  EYES: EOMI, PERRLA, conjunctiva and sclera clear  NECK:  No JVD  CHEST/LUNG: CTABL ; No wheeze  HEART: RRR; No murmurs  ABDOMEN: Soft, Nontender, Nondistended; Bowel sounds present  : No Ocampo  EXTREMITIES:  2+ Peripheral Pulses, No edema  PSYCH: AAOx  NEUROLOGY: R sided hemipareis  SKIN: No rashes or lesions. No sacral ulcer    LABS:                        9.1    8.05  )-----------( 259      ( 27 Apr 2019 14:43 )             30.3           PT/INR - ( 27 Apr 2019 08:00 )   PT: 13.2 SEC;   INR: 1.15          PTT - ( 27 Apr 2019 08:00 )  PTT:32.3 SEC Angélica Samuels, PGY1  Pager: [phone]  After 7: Night Float pager  Alternate contact:     Patient is a 83y old  Male who presents with a chief complaint of aortic aneurysm (28 Apr 2019 07:57)      SUBJECTIVE / OVERNIGHT EVENTS: No acute events overnight. Patient feels okay, but continues to have the hematuria. Cough is on and off, not bothering him this morning. Denies any nausea, vomiting, abdominal pain. Patient passing gas, having BMs.    MEDICATIONS  (STANDING):  cefTRIAXone   IVPB 1 Gram(s) IV Intermittent <User Schedule>  influenza   Vaccine 0.5 milliLiter(s) IntraMuscular once  lisinopril 5 milliGRAM(s) Oral daily  metoprolol tartrate 37.5 milliGRAM(s) Oral two times a day  polyethylene glycol 3350 17 Gram(s) Oral daily  simvastatin 10 milliGRAM(s) Oral at bedtime  tamsulosin 0.4 milliGRAM(s) Oral at bedtime    MEDICATIONS  (PRN):  guaiFENesin    Syrup 100 milliGRAM(s) Oral every 6 hours PRN Cough  hydrALAZINE Injectable 10 milliGRAM(s) IV Push every 6 hours PRN For SBP >180  hydrALAZINE Injectable 5 milliGRAM(s) IV Push every 6 hours PRN For SBP >160 and <180      Vital Signs Last 24 Hrs  T(C): 36.8 (29 Apr 2019 05:27), Max: 37.2 (28 Apr 2019 14:13)  T(F): 98.2 (29 Apr 2019 05:27), Max: 98.9 (28 Apr 2019 14:13)  HR: 60 (29 Apr 2019 05:27) (57 - 61)  BP: 160/84 (29 Apr 2019 05:27) (139/50 - 178/72)  BP(mean): --  RR: 16 (29 Apr 2019 05:27) (16 - 20)  SpO2: 100% (29 Apr 2019 05:27) (96% - 100%)  CAPILLARY BLOOD GLUCOSE        I&O's Summary    28 Apr 2019 07:01  -  29 Apr 2019 07:00  --------------------------------------------------------  IN: 0 mL / OUT: 200 mL / NET: -200 mL        PHYSICAL EXAM:  GENERAL: NAD, well-developed  EYES: conjunctiva and sclera clear  NECK:  No JVD  CHEST/LUNG: CTABL; No wheeze  HEART: RRR; No murmurs  ABDOMEN: Soft, Nontender, Nondistended; Bowel sounds present  : No Ocampo  EXTREMITIES:  2+ Peripheral Pulses, No edema  PSYCH: AAOx  NEUROLOGY: R sided hemipareis  SKIN: No rashes or lesions. No sacral ulcer    LABS:                        9.1    8.05  )-----------( 259      ( 27 Apr 2019 14:43 )             30.3           PT/INR - ( 27 Apr 2019 08:00 )   PT: 13.2 SEC;   INR: 1.15          PTT - ( 27 Apr 2019 08:00 )  PTT:32.3 SEC

## 2019-04-30 VITALS
TEMPERATURE: 98 F | DIASTOLIC BLOOD PRESSURE: 74 MMHG | RESPIRATION RATE: 16 BRPM | SYSTOLIC BLOOD PRESSURE: 139 MMHG | OXYGEN SATURATION: 100 % | HEART RATE: 66 BPM

## 2019-04-30 LAB
HCT VFR BLD CALC: 29.3 % — LOW (ref 39–50)
HGB BLD-MCNC: 8.7 G/DL — LOW (ref 13–17)
MCHC RBC-ENTMCNC: 25.8 PG — LOW (ref 27–34)
MCHC RBC-ENTMCNC: 29.7 % — LOW (ref 32–36)
MCV RBC AUTO: 86.9 FL — SIGNIFICANT CHANGE UP (ref 80–100)
NRBC # FLD: 0 K/UL — SIGNIFICANT CHANGE UP (ref 0–0)
PLATELET # BLD AUTO: 232 K/UL — SIGNIFICANT CHANGE UP (ref 150–400)
PMV BLD: 10 FL — SIGNIFICANT CHANGE UP (ref 7–13)
RBC # BLD: 3.37 M/UL — LOW (ref 4.2–5.8)
RBC # FLD: 14.7 % — HIGH (ref 10.3–14.5)
WBC # BLD: 7.27 K/UL — SIGNIFICANT CHANGE UP (ref 3.8–10.5)
WBC # FLD AUTO: 7.27 K/UL — SIGNIFICANT CHANGE UP (ref 3.8–10.5)

## 2019-04-30 PROCEDURE — 93971 EXTREMITY STUDY: CPT | Mod: 26

## 2019-04-30 PROCEDURE — 99239 HOSP IP/OBS DSCHRG MGMT >30: CPT

## 2019-04-30 RX ORDER — CEFTRIAXONE 500 MG/1
1 INJECTION, POWDER, FOR SOLUTION INTRAMUSCULAR; INTRAVENOUS
Qty: 0 | Refills: 0 | COMMUNITY
Start: 2019-04-30

## 2019-04-30 RX ORDER — CEFPODOXIME PROXETIL 100 MG
1 TABLET ORAL
Qty: 12 | Refills: 0 | OUTPATIENT
Start: 2019-04-30 | End: 2019-05-05

## 2019-04-30 RX ORDER — ASPIRIN/CALCIUM CARB/MAGNESIUM 324 MG
1 TABLET ORAL
Qty: 30 | Refills: 0 | OUTPATIENT
Start: 2019-04-30 | End: 2019-05-29

## 2019-04-30 RX ORDER — SIMVASTATIN 20 MG/1
1 TABLET, FILM COATED ORAL
Qty: 0 | Refills: 0 | COMMUNITY

## 2019-04-30 RX ORDER — SIMVASTATIN 20 MG/1
1 TABLET, FILM COATED ORAL
Qty: 0 | Refills: 0 | COMMUNITY
Start: 2019-04-30

## 2019-04-30 RX ADMIN — POLYETHYLENE GLYCOL 3350 17 GRAM(S): 17 POWDER, FOR SOLUTION ORAL at 13:05

## 2019-04-30 RX ADMIN — Medication 37.5 MILLIGRAM(S): at 05:13

## 2019-04-30 RX ADMIN — CEFTRIAXONE 100 GRAM(S): 500 INJECTION, POWDER, FOR SOLUTION INTRAMUSCULAR; INTRAVENOUS at 13:04

## 2019-04-30 RX ADMIN — LISINOPRIL 10 MILLIGRAM(S): 2.5 TABLET ORAL at 05:13

## 2019-04-30 NOTE — PROGRESS NOTE ADULT - PROBLEM SELECTOR PLAN 6
- resume home meds, lisinopril, metoprolol  - goal SBP <160 in the setting of AAA  - can give IV hydralazine pushes for SBP>160
- productive cough with CXR showing peribroncial wall thickening suggestive of possible viral infection vs bronchitis  - afebrile, continue off abx  - aspiration precautions
- productive cough with CXR showing peribroncial wall thickening suggestive of possible viral infection vs bronchitis  - afebrile, continue off abx  - aspiration precautions

## 2019-04-30 NOTE — PROGRESS NOTE ADULT - PROBLEM SELECTOR PLAN 4
- patient with Hgb of 8.0, unknown baseline, normal MCV  - anemia with low Fe, ferritin low end of nl  - monitor Hgb in the setting of hematuria

## 2019-04-30 NOTE — DISCHARGE NOTE PROVIDER - CARE PROVIDER_API CALL
Adriane Kahn)  Surgery; Vascular Surgery  [address], [address]  [place] [address]  Phone: [phone]  Fax: [phone]  Follow Up Time:     Mauricio Alvarez)  Urology  [address], [address]  [place] [address]  Phone: [phone]  Fax: [phone]  Follow Up Time:     Ila Reyes)  Urology  [address] [place] [id number]  Phone: [phone]  Fax: [phone]  Follow Up Time:

## 2019-04-30 NOTE — PROGRESS NOTE ADULT - ATTENDING COMMENTS
Patient was seen and examined personally by me. I have discussed the plan and reviewed the resident's note and agree with the above physical exam findings including assessment and plan except as indicated below.    # Painless gross hematuria  # UTI  # RLE swelling  # AAA  # h/o CVA with right hemiparesis  # hx CABG with stents    H/H presently stable. Hematuria resolved  4/27 Ucx here so far NGTD. DC on Vantin to complete 7 day course  Outpatient cystoscopy  Continue metoprolol and lisinopril, maintain SBP<160. No surgical intervention for AAA per vascular  RLE US neg for DVT.  Stable for DC to NH. DC time: 32 min Patient was seen and examined personally by me. I have discussed the plan and reviewed the resident's note and agree with the above physical exam findings including assessment and plan except as indicated below.    # Painless gross hematuria  # UTI  # RLE swelling  # AAA  # h/o CVA with right hemiparesis  # hx CABG with stents    H/H presently stable. Hematuria resolved  4/27 Ucx here so far NGTD. DC on Vantin to complete 10 day course  Outpatient cystoscopy  Continue metoprolol and lisinopril, maintain SBP<160. No surgical intervention for AAA per vascular  RLE US neg for DVT.  Stable for DC to NH. DC time: 32 min

## 2019-04-30 NOTE — DISCHARGE NOTE NURSING/CASE MANAGEMENT/SOCIAL WORK - NSDCPEPTSTRK_GEN_ALL_CORE
Stroke support groups for patients, families, and friends/Risk factors for stroke/Stroke education booklet/Prescribed medications/Need for follow up after discharge/Stroke warning signs and symptoms/Signs and symptoms of stroke/Call 911 for stroke

## 2019-04-30 NOTE — PROGRESS NOTE ADULT - SUBJECTIVE AND OBJECTIVE BOX
Angélica Samuels, PGY1  Pager: [phone]  After 7: Night Float pager  Alternate contact:     Patient is a 83y old  Male who presents with a chief complaint of aortic aneurysm (29 Apr 2019 07:56)      SUBJECTIVE / OVERNIGHT EVENTS: No acute events overnight.     MEDICATIONS  (STANDING):  cefTRIAXone   IVPB 1 Gram(s) IV Intermittent <User Schedule>  influenza   Vaccine 0.5 milliLiter(s) IntraMuscular once  lisinopril 10 milliGRAM(s) Oral daily  metoprolol tartrate 37.5 milliGRAM(s) Oral two times a day  polyethylene glycol 3350 17 Gram(s) Oral daily  simvastatin 10 milliGRAM(s) Oral at bedtime  tamsulosin 0.4 milliGRAM(s) Oral at bedtime    MEDICATIONS  (PRN):  guaiFENesin    Syrup 100 milliGRAM(s) Oral every 6 hours PRN Cough  hydrALAZINE Injectable 10 milliGRAM(s) IV Push every 6 hours PRN For SBP >160      Vital Signs Last 24 Hrs  T(C): 36.8 (30 Apr 2019 05:08), Max: 36.9 (29 Apr 2019 09:27)  T(F): 98.2 (30 Apr 2019 05:08), Max: 98.4 (29 Apr 2019 09:27)  HR: 64 (30 Apr 2019 05:08) (56 - 75)  BP: 141/70 (30 Apr 2019 05:08) (139/80 - 192/85)  BP(mean): --  RR: 16 (30 Apr 2019 05:08) (16 - 17)  SpO2: 100% (30 Apr 2019 05:08) (98% - 100%)  CAPILLARY BLOOD GLUCOSE        I&O's Summary    29 Apr 2019 07:01  -  30 Apr 2019 07:00  --------------------------------------------------------  IN: 260 mL / OUT: 1475 mL / NET: -1215 mL        PHYSICAL EXAM:  GENERAL: NAD, well-developed  EYES: EOMI, PERRLA, conjunctiva and sclera clear  NECK:  No JVD  CHEST/LUNG: CTABL ; No wheeze  HEART: RRR; No murmurs  ABDOMEN: Soft, Nontender, Nondistended; Bowel sounds present  : No Ocampo  EXTREMITIES:  2+ Peripheral Pulses, No edema  PSYCH: AAOx  NEUROLOGY: non-focal  SKIN: No rashes or lesions. No sacral ulcer    LABS:                        9.2    7.70  )-----------( 223      ( 29 Apr 2019 07:00 )             30.8     04-29    141  |  107  |  10  ----------------------------<  87  4.1   |  23  |  1.07    Ca    8.8      29 Apr 2019 07:00  Phos  2.8     04-29  Mg     2.0     04-29 Angélica Samuels, PGY1  Pager: [phone]  After 7: Night Float pager  Alternate contact:     Patient is a 83y old  Male who presents with a chief complaint of aortic aneurysm (29 Apr 2019 07:56)      SUBJECTIVE / OVERNIGHT EVENTS: No acute events overnight. Patient feels good this morning. No cough, no abdominal pain. Patient still worried about hematuria.     MEDICATIONS  (STANDING):  cefTRIAXone   IVPB 1 Gram(s) IV Intermittent <User Schedule>  influenza   Vaccine 0.5 milliLiter(s) IntraMuscular once  lisinopril 10 milliGRAM(s) Oral daily  metoprolol tartrate 37.5 milliGRAM(s) Oral two times a day  polyethylene glycol 3350 17 Gram(s) Oral daily  simvastatin 10 milliGRAM(s) Oral at bedtime  tamsulosin 0.4 milliGRAM(s) Oral at bedtime    MEDICATIONS  (PRN):  guaiFENesin    Syrup 100 milliGRAM(s) Oral every 6 hours PRN Cough  hydrALAZINE Injectable 10 milliGRAM(s) IV Push every 6 hours PRN For SBP >160      Vital Signs Last 24 Hrs  T(C): 36.8 (30 Apr 2019 05:08), Max: 36.9 (29 Apr 2019 09:27)  T(F): 98.2 (30 Apr 2019 05:08), Max: 98.4 (29 Apr 2019 09:27)  HR: 64 (30 Apr 2019 05:08) (56 - 75)  BP: 141/70 (30 Apr 2019 05:08) (139/80 - 192/85)  BP(mean): --  RR: 16 (30 Apr 2019 05:08) (16 - 17)  SpO2: 100% (30 Apr 2019 05:08) (98% - 100%)  CAPILLARY BLOOD GLUCOSE        I&O's Summary    29 Apr 2019 07:01  -  30 Apr 2019 07:00  --------------------------------------------------------  IN: 260 mL / OUT: 1475 mL / NET: -1215 mL        PHYSICAL EXAM:  GENERAL: NAD, well-developed  EYES: conjunctiva and sclera clear  NECK:  No JVD  CHEST/LUNG: CTABL; No wheeze  HEART: RRR; No murmurs  ABDOMEN: Soft, Nontender, Nondistended; Bowel sounds present  : No Ocampo  EXTREMITIES: RLE edema  PSYCH: AAOx2  NEUROLOGY: R sided hemiparesis   SKIN: no rashes    LABS:                        9.2    7.70  )-----------( 223      ( 29 Apr 2019 07:00 )             30.8     04-29    141  |  107  |  10  ----------------------------<  87  4.1   |  23  |  1.07    Ca    8.8      29 Apr 2019 07:00  Phos  2.8     04-29  Mg     2.0     04-29

## 2019-04-30 NOTE — DISCHARGE NOTE PROVIDER - HOSPITAL COURSE
HPI: 83 M with PMH of CVA with residual R sided hemiparesis in 1992, CAD s/p CABG X 3 and stents placed, presented as transfer from [facility] who originally presented to [place] with hematuria and cough. Hematuria has been present for past 2 months with some burning on urination. Cough is productive with some phlegm for past few days. Patient had a CXR done for cough which showed mild peribronchial wall thickening at the lung bases, questionable viral infection/bronchitis, chronic bronchitis or asthma and stable cardiomegaly. Patient denies any fever, chills, injury, nausea, vomiting or abdominal pain. At [facility] patient found to have positive UA with nitrite and leukocyte esterase and CT A/P with contrast that showed wall thickening of urinary bladder with inflammatory changes suggestive of cystitis, and was given one dose of IV cipro. Patient had a CTA and CT A/P done with incidental finding of a 3.9 by 3.8 cm infrarenal abdominal aortic aneurysm. An age-indeterminate (possibly chronic with thrombosed false lumen) focal, short segment dissection is favored over thrombosed aneurysm. Patient was found to be hypertensive, given labetalol 20 IVP, clonodine, and started on a nicardipine drip. Patient was then transferred to [facility] ED for AAA findings.         Hospital Course: In the ED T 97.7, HR 60, /91, RR 20 satting 100%. Urine culture was sent and patient received ceftriaxone X1. Vascular surgery was consulted regarding the AAA, however given small asymptomatic AAA with possible thrombosed false lumen from chronic dissection, did not require surgical intervention. It was recommended that systolic blood pressure remain below 160 and outpatient follow up. Patient to have repeat imaging outpatient. Urine culture during this admission was negative, however unsure about results of previous culture. Patient treated for complicate UTI given hematuria with ceftriaxone. Patient to transition to po Vantin to complete a 7 day course of antibiotics. Urology was consulted for gross hematuria. A CT urogram was done which showed possible hematoma in the bladder and questionable cystitis. Patient will need cystoscopy and urology follow up outpatient.         Patient with RLE edema and hemiparesis. VA duplex shows no DVT. As per urology, okay to start aspirin even in the setting of hematuria. Patient was seen by physical therapy and is not a candidate for rehab.         Patient's vitals stable, hemoglobin stable. Patient ready for discharge and return to nursing home.

## 2019-04-30 NOTE — DISCHARGE NOTE NURSING/CASE MANAGEMENT/SOCIAL WORK - NSDCPNDISPN_GEN_ALL_CORE
Side effects of pain management treatment/Safe use, storage and disposal of opioids when prescribed/Education provided on the pain management plan of care/Activities of daily living, including home environment that might     exacerbate pain or reduce effectiveness of the pain management plan of care as well as strategies to address these issues/Opioids not applicable/not prescribed

## 2019-04-30 NOTE — PROGRESS NOTE ADULT - ASSESSMENT
83 M with PMH of CVA with residual R sided hemiparesis in 1992, CAD s/p CABG X 3 and stents placed, presented as transfer from [facility] for incidental finding of AAA, also found to have positive UA being treated for UTI and gross hematuria concerning for possible malignancy.
83 M with PMH of CVA with residual R sided hemiparesis in 1992, CAD s/p CABG X 3 and stents placed, presented as transfer from [facility] for incidental finding of AAA, also found to have positive UA being treated for UTI and gross hematuria concerning for possible malignancy.
83 M with PMH of CVA with residual R sided hemiparesis in 1992, CAD s/p CABG X 3 and stents placed, presented as transfer from [facility] for incidental finding of AAA, also found to have positive UA being treated for UTI and gross hematuria concerning for possible malignancy.

## 2019-04-30 NOTE — PROGRESS NOTE ADULT - PROBLEM SELECTOR PLAN 7
- enlarged nodular prostate w/ mass effect on bladder  - continue tamsulosin 0.4mg daily  - consider PSA for prostate cancer workup
- resume home meds, lisinopril, metoprolol  - goal SBP <160 in the setting of AAA  - can give IV hydralazine pushes for SBP>160
- resume home meds, lisinopril, metoprolol  - goal SBP <160 in the setting of AAA  - can give IV hydralazine pushes for SBP>160

## 2019-04-30 NOTE — DISCHARGE NOTE NURSING/CASE MANAGEMENT/SOCIAL WORK - NSDCDPATPORTLINK_GEN_ALL_CORE
You can access the [facility] Patient Portal, offered by [facility], by registering with the following website: [email or web address]

## 2019-04-30 NOTE — DISCHARGE NOTE PROVIDER - NSDCFUADDINST_GEN_ALL_CORE_FT
Please follow up with your PCP within 1 week of discharge to monitor your blood work.    Please follow up with urology within 2 weeks of discharge.     Please follow up with vascular surgery within 1 month of discharge.

## 2019-04-30 NOTE — DISCHARGE NOTE PROVIDER - PROVIDER TOKENS
PROVIDER:[TOKEN:[[id number]]],PROVIDER:[TOKEN:[[id number]]],PROVIDER:[TOKEN:[[id number]]]

## 2019-04-30 NOTE — PROGRESS NOTE ADULT - PROBLEM SELECTOR PLAN 2
- UA from OSH positive with large nitrite and leukocyte esterase, >500 WBC and RBCs. CT A/P with wall thickening of urinary bladder suggestive of cystitis  - s/p IV cipro at OSH, ceftriaxone X1 in the ED, continue ceftriaxone for now. Will transition to Vantin to complete 7 day course of abx  - urine cultures negative

## 2019-04-30 NOTE — PROGRESS NOTE ADULT - PROBLEM SELECTOR PLAN 1
- patient with gross hematuria, possibly in the setting of infection vs malignancy. CT showed no renal stones  - urology recs greatly appreciated. CT urogram showed hematoma, concern for cystitis  - will need further workup outpatient with cystoscopy  - hemoglobin stable

## 2019-04-30 NOTE — DISCHARGE NOTE PROVIDER - CARE PROVIDERS DIRECT ADDRESSES
,[email or web address]

## 2019-04-30 NOTE — PROGRESS NOTE ADULT - PROBLEM SELECTOR PLAN 5
- productive cough with CXR showing peribroncial wall thickening suggestive of possible viral infection vs bronchitis  - afebrile, continue off abx  - aspiration precautions
- patient with R lower extremity edema, chronic per patient since stroke in 1992  - duplex negative for DVT
- patient with R lower extremity edema, chronic per patient since stroke in 1992  - pending duplex to r/o DVT

## 2019-04-30 NOTE — DISCHARGE NOTE PROVIDER - NSDCCPCAREPLAN_GEN_ALL_CORE_FT
PRINCIPAL DISCHARGE DIAGNOSIS  Diagnosis: Hematuria  Assessment and Plan of Treatment: You came in with blood in the urine. You were seen by urology and a special CT scan of the bladder was done which showed a blood clot in the bladder. The blood in the urine is less now. Your hemoglobin or blood count is stable. You will need to follow up with urology for further testing of why you have blood in the urine and rule out any cancer. Please follow up with your PCP within 1 week to monitor your hemoglobin levels. Please see a doctor right away if your symptoms worsen, if your bleeding worsens, if you become lightheaded or dizzy.      SECONDARY DISCHARGE DIAGNOSES  Diagnosis: UTI (urinary tract infection)  Assessment and Plan of Treatment: You have a urinary tract infection. You were started on antibiotics and will need to continue taking the antibiotics as prescribed. Please follow up with your PCP if you continue to have symptoms of burning on urination.    Diagnosis: Abdominal aortic aneurysm  Assessment and Plan of Treatment: You had a CT scan of    Diagnosis: Cough  Assessment and Plan of Treatment: You have an intermittent cough that is improving. You are at risk of aspirating, so you will need to take precautions to prevent food from going into your breathing tubes and lungs. Please eat and drink sitting up right. Alternate between solids and liquids. Stay upright for a while before lying back down.    Diagnosis: Enlarged prostate  Assessment and Plan of Treatment: You had a CT scan at [place] which showed an enlarged, nodular prostate. You will need to follow up with urology to determine whether or not further testing needs to be done.    Diagnosis: Anemia  Assessment and Plan of Treatment: You have chronic anemia. Your iron levels are within the lower levels of normal. Your hemoglobin has been stable while you were in the hospital. Please follow up with your PCP to recheck your iron levels and your blood counts.    Diagnosis: Lower extremity edema  Assessment and Plan of Treatment: You have had swelling in the right leg since your stroke. An ultrasound was done of the legs which showed no clots. PRINCIPAL DISCHARGE DIAGNOSIS  Diagnosis: Hematuria  Assessment and Plan of Treatment: You came in with blood in the urine. You were seen by urology and a special CT scan of the bladder was done which showed a blood clot in the bladder. The blood in the urine is less now. Your hemoglobin or blood count is stable. You will need to follow up with urology for further testing of why you have blood in the urine and rule out any cancer. Please follow up with your PCP within 1 week to monitor your hemoglobin levels. Please see a doctor right away if your symptoms worsen, if your bleeding worsens, if you become lightheaded or dizzy.      SECONDARY DISCHARGE DIAGNOSES  Diagnosis: Hypertension  Assessment and Plan of Treatment: You have high blood pressure. Please continue your medications as prescribed. Your blood pressure needs to be below 160. Please follow up with your PCP within 1 week of discharge to monitor your blood pressure and adjust your medications appropriately.    Diagnosis: UTI (urinary tract infection)  Assessment and Plan of Treatment: You have a urinary tract infection. You were started on antibiotics and will need to continue taking the antibiotics as prescribed. Please follow up with your PCP if you continue to have symptoms of burning on urination.    Diagnosis: Abdominal aortic aneurysm  Assessment and Plan of Treatment: You had a CT scan of your abdomen at [place] which found an aneurysm or outpouching of the abdominal aorta. You were seen by vascular surgery and it was determined that no surgery was needed because you are not having symptoms and the aneurysm is small. You will need to keep your systolic blood pressure below 160 to prevent worsening of the aneurysm. You will also need repeat imaging in 1 year to monitor the size of the aneurysm. Please follow up with vascular surgery to continue monitoring your blood pressures and the aneurysm.    Diagnosis: Cough  Assessment and Plan of Treatment: You have an intermittent cough that is improving. You are at risk of aspirating, so you will need to take precautions to prevent food from going into your breathing tubes and lungs. Please eat and drink sitting up right. Alternate between solids and liquids. Stay upright for a while before lying back down.    Diagnosis: Enlarged prostate  Assessment and Plan of Treatment: You had a CT scan at [place] which showed an enlarged, nodular prostate. You will need to follow up with urology to determine whether or not further testing needs to be done.    Diagnosis: Anemia  Assessment and Plan of Treatment: You have chronic anemia. Your iron levels are within the lower levels of normal. Your hemoglobin has been stable while you were in the hospital. Please follow up with your PCP to recheck your iron levels and your blood counts.    Diagnosis: Lower extremity edema  Assessment and Plan of Treatment: You have had swelling in the right leg since your stroke. An ultrasound was done of the legs which showed no clots. PRINCIPAL DISCHARGE DIAGNOSIS  Diagnosis: Hematuria  Assessment and Plan of Treatment: You came in with blood in the urine. You were seen by urology and a special CT scan of the bladder was done which showed a blood clot in the bladder. The blood in the urine is less now. Your hemoglobin or blood count is stable. You will need to follow up with urology for further testing of why you have blood in the urine and rule out any cancer. Please follow up with your PCP within 1 week to monitor your hemoglobin levels. Please see a doctor right away if your symptoms worsen, if your bleeding worsens, if you become lightheaded or dizzy.      SECONDARY DISCHARGE DIAGNOSES  Diagnosis: Hypertension  Assessment and Plan of Treatment: You have high blood pressure. Please continue your medications as prescribed. Your blood pressure needs to be below 160. Please follow up with your PCP within 1 week of discharge to monitor your blood pressure and adjust your medications appropriately.    Diagnosis: UTI (urinary tract infection)  Assessment and Plan of Treatment: You have a urinary tract infection. You were started on antibiotics and will need to continue taking the antibiotics as prescribed. Please follow up with your PCP if you continue to have symptoms of burning on urination.    Diagnosis: Abdominal aortic aneurysm  Assessment and Plan of Treatment: You had a CT scan of your abdomen at [place] which found an aneurysm or outpouching of the abdominal aorta. You were seen by vascular surgery and it was determined that no surgery was needed because you are not having symptoms and the aneurysm is small. You will need to keep your systolic blood pressure below 160 to prevent worsening of the aneurysm. You will also need repeat imaging in 1 year to monitor the size of the aneurysm. Please follow up with vascular surgery to continue monitoring your blood pressures and the aneurysm.    Diagnosis: Cough  Assessment and Plan of Treatment: You have an intermittent cough that is improving. You are at risk of aspirating, so you will need to take precautions to prevent food from going into your breathing tubes and lungs. Please eat and drink sitting up right. Alternate between solids and liquids. Stay upright for a while before lying back down.    Diagnosis: Enlarged prostate  Assessment and Plan of Treatment: You had a CT scan at [place] which showed an enlarged, nodular prostate. You will need to follow up with urology to determine whether or not further testing needs to be done.    Diagnosis: Anemia  Assessment and Plan of Treatment: You have chronic anemia. Your iron levels are within the lower levels of normal. Your hemoglobin has been stable while you were in the hospital. Please follow up with your PCP to recheck your iron levels and your blood counts.    Diagnosis: Lower extremity edema  Assessment and Plan of Treatment: You have had swelling in the right leg since your stroke. An ultrasound was done of the legs which showed no clots. PRINCIPAL DISCHARGE DIAGNOSIS  Diagnosis: Hematuria  Assessment and Plan of Treatment: You came in with blood in the urine. You were seen by urology and a special CT scan of the bladder was done which showed a blood clot in the bladder. Your bleeding resolved. Your hemoglobin or blood count is stable. Take the antibiotics as prescribed for bladder infection which could have contributed to the bleeding. You will need to follow up with urology for further testing of why you have blood in the urine and rule out any cancer. Please follow up with your PCP within 1 week to monitor your hemoglobin levels. Please see a doctor right away if your symptoms worsen, if your bleeding worsens, if you become lightheaded or dizzy.      SECONDARY DISCHARGE DIAGNOSES  Diagnosis: Hypertension  Assessment and Plan of Treatment: You have high blood pressure. Please continue your medications as prescribed. Your blood pressure needs to be below 160. Please follow up with your PCP within 1 week of discharge to monitor your blood pressure and adjust your medications appropriately.    Diagnosis: Cough  Assessment and Plan of Treatment: You have an intermittent cough that is improving. You are at risk of aspirating, so you will need to take precautions to prevent food from going into your breathing tubes and lungs. Please eat and drink sitting up right. Alternate between solids and liquids. Stay upright for a while before lying back down.    Diagnosis: Enlarged prostate  Assessment and Plan of Treatment: You had a CT scan at [place] which showed an enlarged, nodular prostate. You will need to follow up with urology to determine whether or not further testing needs to be done.    Diagnosis: Anemia  Assessment and Plan of Treatment: You have chronic anemia. Your iron levels are within the lower levels of normal. Your hemoglobin has been stable while you were in the hospital. Please follow up with your PCP to recheck your iron levels and your blood counts.    Diagnosis: Lower extremity edema  Assessment and Plan of Treatment: You have had swelling in the right leg since your stroke. An ultrasound was done of the legs which showed no clots.    Diagnosis: Abdominal aortic aneurysm  Assessment and Plan of Treatment: You had a CT scan of your abdomen at [place] which found an aneurysm or outpouching of the abdominal aorta. You were seen by vascular surgery and it was determined that no surgery was needed because you are not having symptoms and the aneurysm is small. You will need to keep your systolic blood pressure below 160 to prevent worsening of the aneurysm. You will also need repeat imaging in 1 year to monitor the size of the aneurysm. Please follow up with vascular surgery to continue monitoring your blood pressures and the aneurysm.    Diagnosis: UTI (urinary tract infection)  Assessment and Plan of Treatment: You have a urinary tract infection. You were started on antibiotics and will need to continue taking the antibiotics as prescribed. Please follow up with your PCP if you continue to have symptoms of burning on urination.

## 2019-04-30 NOTE — DISCHARGE NOTE PROVIDER - NSDCCPTREATMENT_GEN_ALL_CORE_FT
PRINCIPAL PROCEDURE  Procedure: CT urogram w contrast  Findings and Treatment: FINDINGS:  LOWER CHEST: Cardiomegaly. Sternotomy.  LIVER: Within normal limits.   BILE DUCTS: Normal caliber.  GALLBLADDER: Within normal limits. Multiple clips in the gallbladder   fossa and periportal space.  SPLEEN: Within normal limits.   PANCREAS: Within normal limits.  ADRENALS: Within normal limits.   KIDNEYS/URETERS: Cysts and other lesions too small to characterize.  No   urothelial lesion.   BLADDER: Layering debris or hematoma within the bladder. Mild mural   thickening.  REPRODUCTIVE ORGANS: Within normal limits.  BOWEL: No bowel obstruction. The rectum is mildly distended with stool.   The appendix is normal.  PERITONEUM: No ascites.   VESSELS:  A 4 cm AAA with ulcerative plaque is noted. 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  RETROPERITONEUM: No lymphadenopathy.     ABDOMINAL WALL: Within normal limits.  BONES: Within normal limits.   IMPRESSION:   A 4 cm abdominal aortic aneurysm with ulcerative plaque.  Rectum mildly distended with stool.  Mild debris or hematoma in the bladder; consider cystoscopy.  Question cystitis.

## 2020-04-27 NOTE — DISCHARGE NOTE PROVIDER - NSDCQMPCI_CARD_ALL_CORE
No Partial Purse String (Intermediate) Text: Given the location of the defect and the characteristics of the surrounding skin an intermediate purse string closure was deemed most appropriate.  Undermining was performed circumfirentially around the surgical defect.  A purse string suture was then placed and tightened. Wound tension only allowed a partial closure of the circular defect.

## 2024-08-05 NOTE — PROGRESS NOTE ADULT - PROBLEM SELECTOR PLAN 4
Rx Refill Note  Requested Prescriptions     Pending Prescriptions Disp Refills    Synthroid 88 MCG tablet [Pharmacy Med Name: SYNTHROID 88 MCG TABLET] 30 tablet 3     Sig: TAKE 1 TABLET BY MOUTH EVERY DAY          Last office visit with prescribing clinician: 4/17/2023     Next office visit with prescribing clinician: Visit date not found         Leigh Gonzalez MA  08/05/24, 09:28 EDT    - patient with Hgb of 8.0, unknown baseline, normal MCV  - anemia with low Fe, ferritin low end. Consider starting Fe pills  - monitor Hgb - patient with Hgb of 8.0, unknown baseline, normal MCV  - anemia with low Fe, ferritin low end. Consider starting Fe pills  - monitor Hgb in the setting of hematuria

## 2025-01-23 NOTE — PROGRESS NOTE ADULT - PROBLEM SELECTOR PLAN 8
Problem: Skin  Goal: Decreased wound size/increased tissue granulation at next dressing change  Outcome: Progressing  Goal: Participates in plan/prevention/treatment measures  Outcome: Progressing  Goal: Prevent/manage excess moisture  Outcome: Progressing  Goal: Prevent/minimize sheer/friction injuries  Outcome: Progressing  Goal: Promote/optimize nutrition  Outcome: Progressing  Goal: Promote skin healing  Outcome: Progressing     Problem: Safety - Adult  Goal: Free from fall injury  Outcome: Progressing   The patient's goals for the shift include Safety    The clinical goals for the shift include pt will remain HDS and safe this shift    Over the shift, the patient did make progress toward the following goals. Pt denies any pain or discomfort. Ambulates to bathroom. Will continue to monitor.    
- DVT ppx: SCD  - Diet: regular diet  - Dispo: pending
- enlarged nodular prostate w/ mass effect on bladder  - continue tamsulosin 0.4mg daily  - consider PSA for prostate cancer workup
- enlarged nodular prostate w/ mass effect on bladder  - continue tamsulosin 0.4mg daily  - consider PSA for prostate cancer workup

## 2025-03-24 NOTE — ED PROVIDER NOTE - NS_BEDUNITTYPES_ED_ALL_ED
PASS  LOV MELA DALY [facility]       
MEDICINE